# Patient Record
Sex: FEMALE | Race: WHITE | NOT HISPANIC OR LATINO | Employment: FULL TIME | URBAN - METROPOLITAN AREA
[De-identification: names, ages, dates, MRNs, and addresses within clinical notes are randomized per-mention and may not be internally consistent; named-entity substitution may affect disease eponyms.]

---

## 2024-01-18 ENCOUNTER — HOSPITAL ENCOUNTER (OUTPATIENT)
Dept: RADIOLOGY | Facility: HOSPITAL | Age: 44
Discharge: HOME | End: 2024-01-18
Payer: COMMERCIAL

## 2024-01-18 DIAGNOSIS — R92.8 OTHER ABNORMAL AND INCONCLUSIVE FINDINGS ON DIAGNOSTIC IMAGING OF BREAST: ICD-10-CM

## 2024-01-18 DIAGNOSIS — R92.1 CALCIFICATION OF LEFT BREAST ON MAMMOGRAPHY: ICD-10-CM

## 2024-01-18 DIAGNOSIS — R92.8 ABNORMAL FINDINGS ON DIAGNOSTIC IMAGING OF BREAST: Primary | ICD-10-CM

## 2024-01-18 PROCEDURE — 77065 DX MAMMO INCL CAD UNI: CPT | Mod: LEFT SIDE | Performed by: RADIOLOGY

## 2024-01-18 PROCEDURE — 77065 DX MAMMO INCL CAD UNI: CPT | Mod: LT

## 2024-01-18 NOTE — NURSING NOTE
After patient review of diagnostic results with Dr. Stevens, support provided. Written literature regarding abnormal breast imaging and breast biopsy including what to expect before, during, and after the procedure reviewed with the patient. All questions answered. Patient selected Dr. Macias for surgical consultation 1/24 1130 with biopsy to follow 1/24 1300. Information provided and reviewed to include provider information and how to reach me directly with questions or concerns before concluding visit.

## 2024-01-18 NOTE — PROGRESS NOTES
Patient follow up scheduling:  left breast stereotactic biopsy per provider recommendation. Order pending Dr. Macias signature.

## 2024-01-24 ENCOUNTER — HOSPITAL ENCOUNTER (OUTPATIENT)
Dept: RADIOLOGY | Facility: HOSPITAL | Age: 44
Discharge: HOME | End: 2024-01-24
Payer: COMMERCIAL

## 2024-01-24 ENCOUNTER — OFFICE VISIT (OUTPATIENT)
Dept: SURGERY | Facility: CLINIC | Age: 44
End: 2024-01-24
Payer: COMMERCIAL

## 2024-01-24 VITALS
SYSTOLIC BLOOD PRESSURE: 121 MMHG | HEART RATE: 58 BPM | OXYGEN SATURATION: 98 % | HEIGHT: 64 IN | WEIGHT: 147 LBS | BODY MASS INDEX: 25.1 KG/M2 | DIASTOLIC BLOOD PRESSURE: 69 MMHG

## 2024-01-24 DIAGNOSIS — R92.1 CALCIFICATION OF LEFT BREAST ON MAMMOGRAPHY: ICD-10-CM

## 2024-01-24 DIAGNOSIS — R92.8 ABNORMAL FINDINGS ON DIAGNOSTIC IMAGING OF BREAST: ICD-10-CM

## 2024-01-24 DIAGNOSIS — R92.30 DENSE BREAST TISSUE: ICD-10-CM

## 2024-01-24 DIAGNOSIS — R92.1 CALCIFICATION OF LEFT BREAST: Primary | ICD-10-CM

## 2024-01-24 DIAGNOSIS — R92.1 BREAST CALCIFICATION, LEFT: ICD-10-CM

## 2024-01-24 PROCEDURE — 19081 BX BREAST 1ST LESION STRTCTC: CPT | Mod: LEFT SIDE | Performed by: RADIOLOGY

## 2024-01-24 PROCEDURE — 2720000007 HC OR 272 NO HCPCS

## 2024-01-24 PROCEDURE — 1036F TOBACCO NON-USER: CPT | Performed by: SURGERY

## 2024-01-24 PROCEDURE — 77065 DX MAMMO INCL CAD UNI: CPT | Mod: LEFT SIDE | Performed by: RADIOLOGY

## 2024-01-24 PROCEDURE — 99203 OFFICE O/P NEW LOW 30 MIN: CPT | Performed by: SURGERY

## 2024-01-24 PROCEDURE — 88305 TISSUE EXAM BY PATHOLOGIST: CPT | Performed by: PATHOLOGY

## 2024-01-24 PROCEDURE — 77065 DX MAMMO INCL CAD UNI: CPT | Mod: LT

## 2024-01-24 PROCEDURE — 19081 BX BREAST 1ST LESION STRTCTC: CPT | Mod: LT

## 2024-01-24 PROCEDURE — 88305 TISSUE EXAM BY PATHOLOGIST: CPT | Mod: TC,SUR,PORLAB | Performed by: SURGERY

## 2024-01-24 RX ORDER — MULTIVITAMIN
1 TABLET ORAL
COMMUNITY

## 2024-01-24 RX ORDER — CALCITRIOL 0.25 UG/1
0.25 CAPSULE ORAL DAILY
COMMUNITY

## 2024-01-24 RX ORDER — FERROUS SULFATE 325(65) MG
TABLET ORAL
COMMUNITY

## 2024-01-24 ASSESSMENT — ENCOUNTER SYMPTOMS
CONSTIPATION: 0
BRUISES/BLEEDS EASILY: 0
ABDOMINAL PAIN: 0
HEMATURIA: 0
NAUSEA: 0
WOUND: 0
DIARRHEA: 0
EYE PAIN: 0
FLANK PAIN: 0
CONFUSION: 0
FREQUENCY: 0
CHILLS: 0
HEADACHES: 0
AGITATION: 0
ARTHRALGIAS: 0
MYALGIAS: 0
SPEECH DIFFICULTY: 0
DYSURIA: 0
FATIGUE: 0
EYE REDNESS: 0
FEVER: 0
WEAKNESS: 0
POLYPHAGIA: 0
VOMITING: 0
SHORTNESS OF BREATH: 0
COUGH: 0

## 2024-01-24 NOTE — PATIENT INSTRUCTIONS
1.  Keep your appointment for your mammogram guided biopsy of your left breast calcifications which is scheduled for 1/24/2024 (today).  This biopsy is performed in the radiology department of [facility].  2.  If you have any problems with the biopsy site (bleeding or concern for infection), please call Dr. Macias's office.  [phone]  3.  Follow-up with Dr. Macias on 1/31/2024 to review your biopsy results.  4.  At some point you will need to have a mammogram of your right breast as it has been 14 months since your mammogram on the right.

## 2024-01-24 NOTE — LETTER
January 24, 2024     GABRIEL Garcias  [address]  [place] [address]    Patient: Debbie Hilton   YOB: 1980   Date of Visit: 1/24/2024       Dear GABRIEL Diez:    Thank you for referring Debbie Hilton to me for evaluation. Below are the relevant portions of my assessment and plan of care.    Assessment / Plan:     If you have questions, please do not hesitate to call me. I look forward to following Debbie along with you.         Sincerely,        Silvia Macias MD        CC: No Recipients

## 2024-01-24 NOTE — PROGRESS NOTES
"    GENERAL SURGERY OFFICE NOTE    Patient: Debbie Hilton    Age: 43 y.o.   Gender: female    MRN: [id number]    PCP: Lan Bernard MD        SUBJECTIVE     Chief Complaint  New Patient Visit (Patient was referred by Lili COOK for abnormal left breast mammogram. Patient states that she is not currently having any problems with her left breast.)       ANA Abrams is a 43-year-old white female who I am seeing in consultation at the request of her primary care physician for left breast calcifications.  She had undergone her first screening mammogram 14 months ago (November 2022) and was found to have a cluster of indeterminate calcifications of the upper outer quadrant of the left breast.  A diagnostic mammogram was recommended.  Patient had \"life issues\" that prevented her from scheduling her diagnostic mammogram until recently.  Recently, she underwent a left diagnostic mammogram (no mammogram on the right) and had persistent calcifications of the upper outer quadrant for which a stereotactic biopsy was recommended.  She is not having any breast issues.  No breast tenderness, palpable masses, skin changes or nipple discharge.    Risk factors for breast cancer: 43-year-old white female; menarche at age 12; first live birth at age 33; no previous breast biopsy; no family history of breast cancer.  No family history of pancreatic, prostate or ovarian cancer.  She did have a maternal grandfather who had skin cancer at age 90.  She is premenopausal.  This gives her a 5-year Tamia score of 0.9% and a lifetime risk of 14.7% which puts her in above average risk category.    ROS  Review of Systems   Constitutional:  Negative for chills, fatigue and fever.   HENT:  Negative for congestion, ear pain and hearing loss.    Eyes:  Negative for pain and redness.   Respiratory:  Negative for cough and shortness of breath.    Cardiovascular:  Negative for chest pain and leg swelling.   Gastrointestinal:  " "Negative for abdominal pain, constipation, diarrhea, nausea and vomiting.   Endocrine: Negative for polyphagia.   Genitourinary:  Negative for dysuria, flank pain, frequency and hematuria.   Musculoskeletal:  Negative for arthralgias and myalgias.   Skin:  Negative for rash and wound.   Allergic/Immunologic: Negative for immunocompromised state.   Neurological:  Negative for speech difficulty, weakness and headaches.   Hematological:  Does not bruise/bleed easily.   Psychiatric/Behavioral:  Negative for agitation and confusion.           HISTORY   History reviewed. No pertinent past medical history.     Past Surgical History:   Procedure Laterality Date    BLADDER SURGERY      sling    PILONIDAL CYST DRAINAGE      TONSILLECTOMY      TUBAL LIGATION      WISDOM TOOTH EXTRACTION          No Known Allergies     Social History     Tobacco Use   Smoking Status Never   Smokeless Tobacco Never        Social History     Substance and Sexual Activity   Alcohol Use Yes    Comment: occasional        HOME MEDICATIONS  Current Outpatient Medications   Medication Instructions    calcitriol (ROCALTROL) 0.25 mcg, oral, Daily    collagen/biotin/ascorbic acid (COLLAGEN 1500 PLUS C ORAL) oral    ferrous sulfate, 325 mg ferrous sulfate, tablet Every 24 hours.    L. acidophilus/Bifid. animalis 32 billion cell capsule Daily RT    multivitamin tablet 1 tablet, oral, Daily RT    RA MAGNESIUM CITRATE PO oral, Daily RT          OBJECTIVE   Last Recorded Vitals.  Blood pressure 121/69, pulse 58, height 1.626 m (5' 4\"), weight 66.7 kg (147 lb), last menstrual period 01/01/2024, SpO2 98 %.     PHYSICAL EXAM  Physical Exam   General: Well-developed, well-nourished and in no acute distress.  Head: Normocephalic. Atraumatic.  Neck/thyroid: Neck is supple.   Eyes: Pupils equal round and reactive to light. Conjunctiva normal.  ENMT: No masses or deformity of external nose. External ears without masses.  Respiratory/Chest:  Normal respiratory " effort.  Breast: Small, symmetrical breasts.  Fibrocystic changes of the upper outer quadrants and lateral quadrants of both breasts.  No predominant mass.  No skin changes.  No nipple discharge.  Lymphatics: No palpable lymphadenopathy of the cervical, supraclavicular or axillary regions.  Cardiovascular: Regular rate and rhythm.   Abdomen: Soft and nondistended.  Musculoskeletal: Joints and limbs are grossly normal. Normal gait. Normal range of motion of major joints.  Neuro: Oriented to person, place and time. No obvious neurological deficit. Motor strength grossly normal.  Psych: Normal mood and affect.    RESULTS   Labs  No results found for this or any previous visit (from the past 24 hour(s)).    Radiology [facility]  MAMMO LEFT DIAGNOSTIC;  1/18/2024 11:39 am      ACCESSION NUMBER(S):  [id number]      ORDERING CLINICIAN:  INTERFACE UNSPECIFIELDPROVIDER      INDICATION:  Signs/Symptoms:abnormal and inconclusive findings on diagnostic  imaging of breast. Left breast calcifications.      COMPARISON:  11/23/2022      FINDINGS:  Density:  The breast tissue is heterogeneously dense, which may  obscure small masses.      The superolateral quadrant of the left breast, middle 3rd, has a  grouping of round to indistinct calcifications corresponding to the  2022 mammogram.      Otherwise no new suspicious masses or calcifications are identified.      This study was interpreted with CAD. Markers: [place]- skin lesion;  triangle- palpable abnormality      IMPRESSION:  The left breast has a grouping of indeterminate calcifications for  which surgical consultation and stereotactic biopsy are recommended.      The patient was referred to our breast care navigator to arrange a  surgical consultation and biopsy.      BI-RADS CATEGORY:      BI-RADS Category:  4 Suspicious.  Recommendation:  Recommendations as Above.  Recommended Date:  Immediate.  Laterality:  Left.      ASSESSMENT / PLAN   d.   Diagnoses and all orders for this  visit:  Calcification of left breast  Dense breast tissue      Plan  Jan 2024: LEFT; cluster of calcs in UOQ originally seen on screen mammo Nov 2022)    1.  Reviewed with the patient that the cluster of indeterminate calcifications of the upper outer quadrant of the left breast require a tissue diagnosis.  The process of the stereotactic biopsy was reviewed with the patient and her .  Her biopsy is scheduled for today 1/24/2024.  2.  Will have her return back to the office 1/31/2024 to review the biopsy results.  3.  Depending on what follow-up is needed after the biopsy results of the left breast calcifications, she will need some type of screening mammogram of the right breast as it has been 14 months since her right breast mammogram.  4.  Her risk factors put her in a slightly above average risk for breast cancer, but her lifetime Tamia score is not greater than 20%.  Therefore, she does not qualify for more frequent screening protocol.      Silvia Macias MD, FACS  [facility] General Surgery  [address] [place] [address];   [employer] [address]  [place]  [address] [phone]

## 2024-01-29 LAB
LABORATORY COMMENT REPORT: NORMAL
PATH REPORT.FINAL DX SPEC: NORMAL
PATH REPORT.GROSS SPEC: NORMAL
PATH REPORT.RELEVANT HX SPEC: NORMAL
PATH REPORT.TOTAL CANCER: NORMAL

## 2024-01-31 ENCOUNTER — OFFICE VISIT (OUTPATIENT)
Dept: SURGERY | Facility: CLINIC | Age: 44
End: 2024-01-31
Payer: COMMERCIAL

## 2024-01-31 VITALS
DIASTOLIC BLOOD PRESSURE: 61 MMHG | HEART RATE: 53 BPM | WEIGHT: 147 LBS | BODY MASS INDEX: 25.1 KG/M2 | HEIGHT: 64 IN | OXYGEN SATURATION: 98 % | SYSTOLIC BLOOD PRESSURE: 99 MMHG

## 2024-01-31 DIAGNOSIS — R92.1 CALCIFICATION OF LEFT BREAST: Primary | ICD-10-CM

## 2024-01-31 DIAGNOSIS — R92.30 DENSE BREAST TISSUE: ICD-10-CM

## 2024-01-31 PROCEDURE — 1036F TOBACCO NON-USER: CPT | Performed by: SURGERY

## 2024-01-31 PROCEDURE — 99212 OFFICE O/P EST SF 10 MIN: CPT | Performed by: SURGERY

## 2024-01-31 ASSESSMENT — ENCOUNTER SYMPTOMS
BRUISES/BLEEDS EASILY: 0
MYALGIAS: 0
CONSTIPATION: 0
FREQUENCY: 0
COUGH: 0
CHILLS: 0
EYE PAIN: 0
ABDOMINAL PAIN: 0
SPEECH DIFFICULTY: 0
SHORTNESS OF BREATH: 0
FATIGUE: 0
FLANK PAIN: 0
HEADACHES: 0
NAUSEA: 0
HEMATURIA: 0
EYE REDNESS: 0
FEVER: 0
WOUND: 0
WEAKNESS: 0
ARTHRALGIAS: 0
AGITATION: 0
POLYPHAGIA: 0
DIARRHEA: 0
CONFUSION: 0
VOMITING: 0
DYSURIA: 0

## 2024-01-31 NOTE — PROGRESS NOTES
GENERAL SURGERY OFFICE NOTE    Patient: Debbie Hilton    Age: 43 y.o.   Gender: female    MRN: [id number]    PCP: Lan Bernard MD        SUBJECTIVE     Chief Complaint  Follow-up (Patient is here for a follow up left breast biopsy. Patient states that she did not have any problems with the biopsy.)       HPI  Debbie Abrams returns to the office for follow-up after undergoing a stereotactic biopsy of left breast calcifications.  She states there is no significant issues at the time of the biopsy.  She did not develop any bruising until yesterday.  She has a small area of yellow bruising just above her NAC.  No significant pain.  No erythema or drainage.  She is here to review biopsy results.    Risk factors for breast cancer: 43-year-old white female; menarche at age 12; first live birth at age 33; no previous breast biopsy; no family history of breast cancer.  No family history of pancreatic, prostate or ovarian cancer.  She did have a maternal grandfather who had skin cancer at age 90.  She is premenopausal.  This gives her a 5-year Tamia score of 0.9% and a lifetime risk of 14.7% which puts her in above average risk category.    ROS  Review of Systems   Constitutional:  Negative for chills, fatigue and fever.   HENT:  Negative for congestion, ear pain and hearing loss.    Eyes:  Negative for pain and redness.   Respiratory:  Negative for cough and shortness of breath.    Cardiovascular:  Negative for chest pain and leg swelling.   Gastrointestinal:  Negative for abdominal pain, constipation, diarrhea, nausea and vomiting.   Endocrine: Negative for polyphagia.   Genitourinary:  Negative for dysuria, flank pain, frequency and hematuria.   Musculoskeletal:  Negative for arthralgias and myalgias.   Skin:  Negative for rash and wound.   Allergic/Immunologic: Negative for immunocompromised state.   Neurological:  Negative for speech difficulty, weakness and headaches.   Hematological:  Does not bruise/bleed  "easily.   Psychiatric/Behavioral:  Negative for agitation and confusion.           HISTORY   History reviewed. No pertinent past medical history.     Past Surgical History:   Procedure Laterality Date    BLADDER SURGERY      sling    PILONIDAL CYST DRAINAGE      TONSILLECTOMY      TUBAL LIGATION      WISDOM TOOTH EXTRACTION          No Known Allergies     Social History     Tobacco Use   Smoking Status Never   Smokeless Tobacco Never        Social History     Substance and Sexual Activity   Alcohol Use Yes    Comment: occasional        HOME MEDICATIONS  Current Outpatient Medications   Medication Instructions    calcitriol (ROCALTROL) 0.25 mcg, oral, Daily    collagen/biotin/ascorbic acid (COLLAGEN 1500 PLUS C ORAL) oral    ferrous sulfate, 325 mg ferrous sulfate, tablet Every 24 hours.    L. acidophilus/Bifid. animalis 32 billion cell capsule Daily RT    multivitamin tablet 1 tablet, oral, Daily RT    RA MAGNESIUM CITRATE PO oral, Daily RT          OBJECTIVE   Last Recorded Vitals.  Blood pressure 99/61, pulse 53, height 1.626 m (5' 4\"), weight 66.7 kg (147 lb), last menstrual period 01/01/2024, SpO2 98 %.     PHYSICAL EXAM  Physical Exam   General: Well-developed, well-nourished and in no acute distress.  Head: Normocephalic. Atraumatic.  Neck/thyroid: Neck is supple.   Eyes: Pupils equal round and reactive to light. Conjunctiva normal.  ENMT: No masses or deformity of external nose. External ears without masses.  Respiratory/Chest:  Normal respiratory effort.  Breast: Small, symmetrical breasts.  Fibrocystic changes of the upper outer quadrants and lateral quadrants of both breasts.  No predominant mass.  No erythema and no nipple discharge.  3 x 2 cm area above the NAC with yellowish discoloration, but no drainage or skin necrosis.  Lymphatics: No palpable lymphadenopathy of the cervical, supraclavicular or axillary regions.  Cardiovascular: Regular rate and rhythm.   Abdomen: Soft and " nondistended.  Musculoskeletal: Joints and limbs are grossly normal. Normal gait. Normal range of motion of major joints.  Neuro: Oriented to person, place and time. No obvious neurological deficit. Motor strength grossly normal.  Psych: Normal mood and affect.    RESULTS   Labs  No results found for this or any previous visit (from the past 24 hour(s)).    [facility]  MAMMO LEFT DIAGNOSTIC;  1/18/2024 11:39 am      ACCESSION NUMBER(S):  [id number]      ORDERING CLINICIAN:  INTERFACE UNSPECIFIELDPROVIDER      INDICATION:  Signs/Symptoms:abnormal and inconclusive findings on diagnostic  imaging of breast. Left breast calcifications.      COMPARISON:  11/23/2022      FINDINGS:  Density:  The breast tissue is heterogeneously dense, which may  obscure small masses.      The superolateral quadrant of the left breast, middle 3rd, has a  grouping of round to indistinct calcifications corresponding to the  2022 mammogram.      Otherwise no new suspicious masses or calcifications are identified.      This study was interpreted with CAD. Markers: [place]- skin lesion;  triangle- palpable abnormality      IMPRESSION:  The left breast has a grouping of indeterminate calcifications for  which surgical consultation and stereotactic biopsy are recommended.      The patient was referred to our breast care navigator to arrange a  surgical consultation and biopsy.      BI-RADS CATEGORY:      BI-RADS Category:  4 Suspicious.  Recommendation:  Recommendations as Above.  Recommended Date:  Immediate.  Laterality:  Left.    Pathology  FINAL DIAGNOSIS   A. Breast, left calcifications, stereotactic guided core needle biopsy:      -- Fibroadenomatous changes with associated micro calcifications.      : Dr Eunice Fajardo     Electronically signed by Asya Law MD on 1/29/2024 at 1558       ASSESSMENT / PLAN     Diagnoses and all orders for this visit:  Calcification of left breast  -     BI mammo bilateral  screening tomosynthesis; Future  Dense breast tissue      Plan  Jan 2024: LEFT; cluster of calcs in UOQ originally seen on screen mammo Nov 2022)    1.  The stereotactic biopsy of the cluster of calcifications of the left breast show fibrocystic changes with associated calcifications.  Discussed the benign pathology report with the patient and her .  Will plan for short-term follow-up with a 6-month mammogram of the left breast.  2.  Since she has not had a screening mammogram of her right breast in more than 14 months, we will plan to do a screening mammogram of the right breast in 6 months when she has her follow-up mammogram of the left breast calcifications.  3.  Her risk factors put her in a slightly above average risk for breast cancer, but her lifetime Tamia score is not greater than 20%.  Therefore, she does not qualify for more frequent screening protocol.      Silvia Macias MD, FACS  [facility] General Surgery  [address] [place] [address];   [employer] [address]  [place]  [address] [phone]

## 2024-01-31 NOTE — PATIENT INSTRUCTIONS
1.  The biopsy of your left breast calcifications does NOT identify any cancer.  Will plan for short-term follow-up evaluation with a mammogram in 6 months.  Since she will be due for your right breast screening mammogram, we will plan to do a mammogram of both breast at that time.  2.  Follow-up in Dr. Macias's office after your mammogram in 6 months.  3.  Do your monthly self breast exams.  If you identify any abnormalities, please call Dr. Macias's office immediately.  [phone]

## 2024-07-29 ENCOUNTER — HOSPITAL ENCOUNTER (OUTPATIENT)
Dept: RADIOLOGY | Facility: CLINIC | Age: 44
Discharge: HOME | End: 2024-07-29
Payer: COMMERCIAL

## 2024-07-29 VITALS — BODY MASS INDEX: 24.75 KG/M2 | WEIGHT: 145 LBS | HEIGHT: 64 IN

## 2024-07-29 DIAGNOSIS — R92.1 CALCIFICATION OF LEFT BREAST: ICD-10-CM

## 2024-07-29 PROCEDURE — 77067 SCR MAMMO BI INCL CAD: CPT | Performed by: RADIOLOGY

## 2024-07-29 PROCEDURE — 77063 BREAST TOMOSYNTHESIS BI: CPT | Performed by: RADIOLOGY

## 2024-07-29 PROCEDURE — 77067 SCR MAMMO BI INCL CAD: CPT

## 2024-08-06 ENCOUNTER — APPOINTMENT (OUTPATIENT)
Dept: SURGERY | Facility: CLINIC | Age: 44
End: 2024-08-06
Payer: COMMERCIAL

## 2024-08-13 ENCOUNTER — APPOINTMENT (OUTPATIENT)
Dept: SURGERY | Facility: CLINIC | Age: 44
End: 2024-08-13
Payer: COMMERCIAL

## 2024-08-13 VITALS
BODY MASS INDEX: 25.57 KG/M2 | HEIGHT: 64 IN | WEIGHT: 149.8 LBS | HEART RATE: 52 BPM | SYSTOLIC BLOOD PRESSURE: 106 MMHG | DIASTOLIC BLOOD PRESSURE: 66 MMHG | OXYGEN SATURATION: 98 %

## 2024-08-13 DIAGNOSIS — R92.30 DENSE BREAST TISSUE: ICD-10-CM

## 2024-08-13 DIAGNOSIS — R92.1 CALCIFICATION OF LEFT BREAST: Primary | ICD-10-CM

## 2024-08-13 PROCEDURE — 3008F BODY MASS INDEX DOCD: CPT | Performed by: SURGERY

## 2024-08-13 PROCEDURE — 1036F TOBACCO NON-USER: CPT | Performed by: SURGERY

## 2024-08-13 PROCEDURE — 99213 OFFICE O/P EST LOW 20 MIN: CPT | Performed by: SURGERY

## 2024-08-13 RX ORDER — TERBINAFINE HYDROCHLORIDE 250 MG/1
250 TABLET ORAL DAILY
COMMUNITY

## 2024-08-13 ASSESSMENT — ENCOUNTER SYMPTOMS
CHILLS: 0
FLANK PAIN: 0
CONFUSION: 0
MYALGIAS: 0
FATIGUE: 0
WOUND: 0
EYE REDNESS: 0
POLYPHAGIA: 0
WEAKNESS: 0
DYSURIA: 0
BRUISES/BLEEDS EASILY: 0
AGITATION: 0
HEADACHES: 0
FEVER: 0
HEMATURIA: 0
VOMITING: 0
SPEECH DIFFICULTY: 0
FREQUENCY: 0
ARTHRALGIAS: 0
EYE PAIN: 0
ABDOMINAL PAIN: 0
DIARRHEA: 0
SHORTNESS OF BREATH: 0
CONSTIPATION: 0
NAUSEA: 0
COUGH: 0

## 2024-08-13 NOTE — PATIENT INSTRUCTIONS
1.  The biopsy of your left breast calcifications does NOT identify any cancer.  Will continue to follow these calcifications for 2 years to make sure that there is no change in the calcifications.  Therefore, you will be scheduled for a left-sided mammogram in 6 months.  2.  Follow-up in Dr. Macias's office after your mammogram in 6 months.  3.  Do your monthly self breast exams.  If you identify any abnormalities, please call Dr. Macias's office immediately.  [phone]

## 2024-08-13 NOTE — PROGRESS NOTES
GENERAL SURGERY OFFICE NOTE    Patient: Debbie Hilton    Age: 44 y.o.   Gender: female    MRN: [id number]    PCP: Lan Bernard MD        SUBJECTIVE     Chief Complaint  Follow-up (Patient is here for a 6 month left breast follow up. Patient states that she is not currently having any problems with her left breast. )       HPI  Debbie Abrams returns to the office for a 6-month follow up of left breast calcifications. She currently denies any new breast issues. No new palpable masses she underwent her yearly screening mammogram of both breasts.  No new abnormalities on the right.  The calcifications on the left appear stable.  She has no other new health/medical concerns.    Risk factors for breast cancer: 43-year-old white female; menarche at age 12; first live birth at age 33; no previous breast biopsy; no family history of breast cancer.  No family history of pancreatic, prostate or ovarian cancer.  She did have a maternal grandfather who had skin cancer at age 90.  She is premenopausal.  This gives her a 5-year Tamia score of 0.9% and a lifetime risk of 14.7% which puts her in above average risk category.    ROS  Review of Systems   Constitutional:  Negative for chills, fatigue and fever.   HENT:  Negative for congestion, ear pain and hearing loss.    Eyes:  Negative for pain and redness.   Respiratory:  Negative for cough and shortness of breath.    Cardiovascular:  Negative for chest pain and leg swelling.   Gastrointestinal:  Negative for abdominal pain, constipation, diarrhea, nausea and vomiting.   Endocrine: Negative for polyphagia.   Genitourinary:  Negative for dysuria, flank pain, frequency and hematuria.   Musculoskeletal:  Negative for arthralgias and myalgias.   Skin:  Negative for rash and wound.   Allergic/Immunologic: Negative for immunocompromised state.   Neurological:  Negative for speech difficulty, weakness and headaches.   Hematological:  Does not bruise/bleed easily.  "  Psychiatric/Behavioral:  Negative for agitation and confusion.           HISTORY   History reviewed. No pertinent past medical history.     Past Surgical History:   Procedure Laterality Date    BLADDER SURGERY      sling    BREAST BIOPSY Left     PILONIDAL CYST DRAINAGE      TONSILLECTOMY      TUBAL LIGATION      WISDOM TOOTH EXTRACTION          No Known Allergies     Social History     Tobacco Use   Smoking Status Never   Smokeless Tobacco Never        Social History     Substance and Sexual Activity   Alcohol Use Yes    Comment: occasional        HOME MEDICATIONS  Current Outpatient Medications   Medication Instructions    calcitriol (ROCALTROL) 0.25 mcg, oral, Daily    collagen/biotin/ascorbic acid (COLLAGEN 1500 PLUS C ORAL) oral    ferrous sulfate, 325 mg ferrous sulfate, tablet Every 24 hours.    L. acidophilus/Bifid. animalis 32 billion cell capsule Daily RT    multivitamin tablet 1 tablet, oral, Daily RT    RA MAGNESIUM CITRATE PO oral, Daily RT    terbinafine (LAMISIL) 250 mg, oral, Daily          OBJECTIVE   Last Recorded Vitals.  Blood pressure 106/66, pulse 52, height 1.626 m (5' 4\"), weight 67.9 kg (149 lb 12.8 oz), last menstrual period 07/22/2024, SpO2 98%.     PHYSICAL EXAM  Physical Exam   General: Well-developed, well-nourished and in no acute distress.  Head: Normocephalic. Atraumatic.  Neck/thyroid: Neck is supple.   Eyes: Pupils equal round and reactive to light. Conjunctiva normal.  ENMT: No masses or deformity of external nose. External ears without masses.  Respiratory/Chest:  Normal respiratory effort.  Breast: Small, symmetrical breasts.  Fibrocystic changes of the upper outer quadrants and lateral quadrants of both breasts.  No predominant mass.  No erythema and no nipple discharge.    Lymphatics: No palpable lymphadenopathy of the cervical, supraclavicular or axillary regions.  Cardiovascular: Regular rate and rhythm.   Abdomen: Soft and nondistended.  Musculoskeletal: Joints and limbs " are grossly normal. Normal gait. Normal range of motion of major joints.  Neuro: Oriented to person, place and time. No obvious neurological deficit. Motor strength grossly normal.  Psych: Normal mood and affect.    RESULTS   Labs  No results found for this or any previous visit (from the past 24 hour(s)).    Radiology [facility]  MAMMO BILATERAL SCREENING TOMOSYNTHESIS;  7/29/2024 8:13 am      ACCESSION NUMBER(S):  [id number]      ORDERING CLINICIAN:  ARIA RAGLAND      INDICATION:  Screening.      COMPARISON:  11/23/2022      FINDINGS:  2D and tomosynthesis images were reviewed at 1 mm slice thickness.      Density:  The breast tissue is heterogeneously dense, which may  obscure small masses.      Lymph node posterior depth right breast stable.      Punctate calcifications left breast are stable. Redemonstration of  biopsy clip left breast.      No suspicious masses or calcifications are identified.      IMPRESSION:  No mammographic evidence of malignancy.      BI-RADS CATEGORY:  BI-RADS Category:  2 Benign.  Recommendation:  Annual Screening.  Recommended Date:  1 Year.  Laterality:  Bilateral.    Pathology  FINAL DIAGNOSIS   A. Breast, left calcifications, stereotactic guided core needle biopsy:      -- Fibroadenomatous changes with associated micro calcifications.      : Dr Eunice Fajardo     Electronically signed by Asya Law MD on 1/29/2024 at 1558       ASSESSMENT / PLAN     Diagnoses and all orders for this visit:  Calcification of left breast  -     BI mammo left diagnostic tomosynthesis; Future  Dense breast tissue        Plan  Jan 2024: LEFT; cluster of calcs in UOQ originally seen on screen mammo Nov 2022; CNB showed Fibroadenomatous changes with associated micro calcifications.    1.  The stereotactic biopsy of the cluster of calcifications of the left breast show fibrocystic changes with associated calcifications.  Her short-term follow-up at 6 months shows that the  calcifications are stable.  Reviewed that these calcifications will be monitored every 6 months for up to 2 years to demonstrate stability.  Therefore, she will be scheduled for a left diagnostic mammogram in 6 months.  2.  Her risk factors put her in a slightly above average risk for breast cancer, but her lifetime Tamia score is not greater than 20%.  Therefore, she does not qualify for more frequent screening protocol.  3.  She will return to the office after her left diagnostic mammogram in 6 months.      Silvia Macias MD, FACS  [facility] General Surgery  [address] [place] [address];   [employer] [address]  [place]  [address] [phone]

## 2025-02-18 ENCOUNTER — HOSPITAL ENCOUNTER (OUTPATIENT)
Dept: RADIOLOGY | Facility: HOSPITAL | Age: 45
Discharge: HOME | End: 2025-02-18
Payer: COMMERCIAL

## 2025-02-18 DIAGNOSIS — R92.1 CALCIFICATION OF LEFT BREAST: ICD-10-CM

## 2025-02-18 PROCEDURE — 77065 DX MAMMO INCL CAD UNI: CPT | Mod: LEFT SIDE | Performed by: RADIOLOGY

## 2025-02-18 PROCEDURE — 77061 BREAST TOMOSYNTHESIS UNI: CPT | Mod: LT

## 2025-02-18 PROCEDURE — 77061 BREAST TOMOSYNTHESIS UNI: CPT | Mod: LEFT SIDE | Performed by: RADIOLOGY

## 2025-02-25 ENCOUNTER — TELEPHONE (OUTPATIENT)
Dept: SURGERY | Facility: CLINIC | Age: 45
End: 2025-02-25

## 2025-02-25 ENCOUNTER — APPOINTMENT (OUTPATIENT)
Facility: CLINIC | Age: 45
End: 2025-02-25
Payer: COMMERCIAL

## 2025-02-25 NOTE — TELEPHONE ENCOUNTER
Patient called to cancel her 6 month mammogram follow up. She states that she is doing well and does not need a follow up. I called to confirm with patient that she will continue to get her mammograms by her primary care physician.